# Patient Record
Sex: FEMALE | Race: WHITE | NOT HISPANIC OR LATINO | Employment: FULL TIME | URBAN - NONMETROPOLITAN AREA
[De-identification: names, ages, dates, MRNs, and addresses within clinical notes are randomized per-mention and may not be internally consistent; named-entity substitution may affect disease eponyms.]

---

## 2017-01-16 RX ORDER — CITALOPRAM 40 MG/1
TABLET ORAL
Qty: 30 TABLET | Refills: 3 | Status: SHIPPED | OUTPATIENT
Start: 2017-01-16 | End: 2017-04-21 | Stop reason: SDUPTHER

## 2017-02-16 ENCOUNTER — TELEPHONE (OUTPATIENT)
Dept: OBSTETRICS AND GYNECOLOGY | Facility: CLINIC | Age: 52
End: 2017-02-16

## 2017-02-16 RX ORDER — ESTRADIOL 1 MG/1
1 TABLET ORAL DAILY
Qty: 30 TABLET | Refills: 4 | Status: SHIPPED | OUTPATIENT
Start: 2017-02-16 | End: 2017-07-13 | Stop reason: SDUPTHER

## 2017-02-16 NOTE — TELEPHONE ENCOUNTER
----- Message from Hyacinth Walton sent at 2/16/2017  3:51 PM EST -----  Contact: DR RÍOS'S PT  NEEDS REFILL ON HER ESTRADIOL.  THANKS,  HYACINTH

## 2017-04-24 RX ORDER — CITALOPRAM 40 MG/1
TABLET ORAL
Qty: 30 TABLET | Refills: 1 | Status: SHIPPED | OUTPATIENT
Start: 2017-04-24

## 2017-04-27 RX ORDER — LINACLOTIDE 145 UG/1
CAPSULE, GELATIN COATED ORAL
Qty: 30 CAPSULE | Refills: 3 | Status: SHIPPED | OUTPATIENT
Start: 2017-04-27

## 2017-07-13 RX ORDER — ESTRADIOL 1 MG/1
TABLET ORAL
Qty: 30 TABLET | Refills: 4 | Status: SHIPPED | OUTPATIENT
Start: 2017-07-13 | End: 2020-03-25

## 2020-03-25 RX ORDER — ESTRADIOL 1 MG/1
TABLET ORAL
Qty: 90 TABLET | Refills: 3 | Status: SHIPPED | OUTPATIENT
Start: 2020-03-25 | End: 2021-06-23 | Stop reason: SDUPTHER

## 2021-05-27 RX ORDER — ESTRADIOL 1 MG/1
TABLET ORAL
Qty: 90 TABLET | Refills: 3 | OUTPATIENT
Start: 2021-05-27

## 2021-06-23 ENCOUNTER — OFFICE VISIT (OUTPATIENT)
Dept: OBSTETRICS AND GYNECOLOGY | Facility: CLINIC | Age: 56
End: 2021-06-23

## 2021-06-23 VITALS
BODY MASS INDEX: 29.25 KG/M2 | HEIGHT: 66 IN | DIASTOLIC BLOOD PRESSURE: 72 MMHG | SYSTOLIC BLOOD PRESSURE: 104 MMHG | WEIGHT: 182 LBS

## 2021-06-23 DIAGNOSIS — Z01.419 ENCOUNTER FOR GYNECOLOGICAL EXAMINATION WITHOUT ABNORMAL FINDING: Primary | ICD-10-CM

## 2021-06-23 DIAGNOSIS — Z12.4 SCREENING FOR MALIGNANT NEOPLASM OF CERVIX: ICD-10-CM

## 2021-06-23 DIAGNOSIS — Z12.11 ENCOUNTER FOR SCREENING COLONOSCOPY: ICD-10-CM

## 2021-06-23 DIAGNOSIS — Z12.31 ENCOUNTER FOR SCREENING MAMMOGRAM FOR BREAST CANCER: ICD-10-CM

## 2021-06-23 PROCEDURE — 99386 PREV VISIT NEW AGE 40-64: CPT | Performed by: OBSTETRICS & GYNECOLOGY

## 2021-06-23 RX ORDER — FEXOFENADINE HCL 180 MG/1
TABLET ORAL
COMMUNITY

## 2021-06-23 RX ORDER — ESTRADIOL 1 MG/1
1 TABLET ORAL DAILY
Qty: 90 TABLET | Refills: 4 | Status: SHIPPED | OUTPATIENT
Start: 2021-06-23 | End: 2022-06-27

## 2021-06-23 RX ORDER — UBIDECARENONE 75 MG
CAPSULE ORAL
COMMUNITY

## 2021-06-23 RX ORDER — ESTRADIOL 0.03 MG/D
FILM, EXTENDED RELEASE TRANSDERMAL
COMMUNITY
End: 2021-06-23

## 2021-06-23 NOTE — PROGRESS NOTES
Subjective   Chief Complaint   Patient presents with   • Gynecologic Exam     no concern/complaints     Lovely Cohen is a 56 y.o. year old .  No LMP recorded. Patient has had a hysterectomy.  She presents to be seen because of annual PE  Complete Hyst--Estradiol 1mg  Needs MMG.     OTHER COMPLAINTS:  Nothing else    The following portions of the patient's history were reviewed and updated as appropriate:  She  has a past medical history of Constipation, Herpes simplex, History of anxiety disorder, History of backache (), History of measles, History of mumps, History of postmenopausal hormone replacement therapy, History of varicella, History of vitamin D deficiency, and Palpitations.  She does not have any pertinent problems on file.  She  has a past surgical history that includes  section; Cystoscopy (); Dilation and curettage of uterus (); Foot surgery; Hysterectomy (); Oophorectomy; Shoulder surgery; Tonsillectomy; Colonoscopy; and Knee aspiration.  Her family history includes Bone cancer in her maternal grandmother; Diabetes type II in her maternal grandfather; Heart disease in her maternal grandfather, paternal grandfather, and another family member; Seizures in her daughter and sister; Thyroid cancer in her mother.  She  reports that she has never smoked. She has never used smokeless tobacco. She reports current alcohol use. She reports that she does not use drugs.  Current Outpatient Medications   Medication Sig Dispense Refill   • albuterol (PROVENTIL HFA;VENTOLIN HFA) 108 (90 BASE) MCG/ACT inhaler Inhale 2 puffs every 6 (six) hours as needed for shortness of air. 1 inhaler 1   • aspirin  MG tablet Take 1 tablet by mouth daily. 30 tablet 1   • citalopram (CELEXA) 20 MG tablet Take 1 tablet by mouth daily. 30 tablet 0   • citalopram (CeleXA) 40 MG tablet TAKE 1 TABLET BY MOUTH EVERY DAY 30 tablet 1   • estradiol (ESTRACE) 1 MG tablet TAKE 1 TABLET BY MOUTH DAILY  90 tablet 3   • fexofenadine (ALLEGRA ALLERGY) 180 MG tablet Take  by mouth daily.     • fexofenadine (Allegra Allergy) 180 MG tablet Allegra     • LINZESS 145 MCG capsule 1 TABLET DAILY BEFORE THE 1ST MEAL 30 capsule 3   • MULTIPLE VITAMINS ESSENTIAL PO Take  by mouth daily.     • valACYclovir (VALTREX) 1000 MG tablet Take  by mouth every 12 (twelve) hours.     • vitamin B-12 (CYANOCOBALAMIN) 100 MCG tablet Vitamin B-12       No current facility-administered medications for this visit.     Current Outpatient Medications on File Prior to Visit   Medication Sig   • albuterol (PROVENTIL HFA;VENTOLIN HFA) 108 (90 BASE) MCG/ACT inhaler Inhale 2 puffs every 6 (six) hours as needed for shortness of air.   • aspirin  MG tablet Take 1 tablet by mouth daily.   • citalopram (CELEXA) 20 MG tablet Take 1 tablet by mouth daily.   • citalopram (CeleXA) 40 MG tablet TAKE 1 TABLET BY MOUTH EVERY DAY   • estradiol (ESTRACE) 1 MG tablet TAKE 1 TABLET BY MOUTH DAILY   • fexofenadine (ALLEGRA ALLERGY) 180 MG tablet Take  by mouth daily.   • fexofenadine (Allegra Allergy) 180 MG tablet Allegra   • LINZESS 145 MCG capsule 1 TABLET DAILY BEFORE THE 1ST MEAL   • MULTIPLE VITAMINS ESSENTIAL PO Take  by mouth daily.   • valACYclovir (VALTREX) 1000 MG tablet Take  by mouth every 12 (twelve) hours.   • vitamin B-12 (CYANOCOBALAMIN) 100 MCG tablet Vitamin B-12   • [DISCONTINUED] estradiol (VIVELLE-DOT) 0.025 MG/24HR patch estradiol     No current facility-administered medications on file prior to visit.     She is allergic to augmentin [amoxicillin-pot clavulanate] and penicillins.    Social History    Tobacco Use      Smoking status: Never Smoker      Smokeless tobacco: Never Used    Review of Systems  Consitutional POS: nothing reported    NEG: anorexia or night sweats   Gastointestinal POS: nothing reported    NEG: bloating, change in bowel habits, melena or reflux symptoms   Genitourinary POS: nothing reported    NEG: dysuria or  "hematuria   Integument POS: nothing reported    NEG: moles that are changing in size, shape, color or rashes   Breast POS: nothing reported    NEG: persistent breast lump, skin dimpling or nipple discharge         Respiratory: negative  Cardiovascular: negative          Objective   /72   Ht 167.6 cm (66\")   Wt 82.6 kg (182 lb)   BMI 29.38 kg/m²     General:  well developed; well nourished  no acute distress   Skin:  No suspicious lesions seen   Thyroid: normal to inspection and palpation   Lungs:  breathing is unlabored  clear to auscultation bilaterally   Heart:  Not performed.   Breasts:  Examined in supine position  Symmetric without masses or skin dimpling  Nipples normal without inversion, lesions or discharge  There are no palpable axillary nodes   Abdomen: soft, non-tender; no masses  no umbilical or inguinal hernias are present  no hepato-splenomegaly   Pelvis: Clinical staff was present for exam  External genitalia:  normal appearance of the external genitalia including Bartholin's and [place]'s glands.  :  urethral meatus normal;  Vaginal:  normal pink mucosa without prolapse or lesions.  Cervix:  absent.  Uterus:  absent.  Adnexa:  absent, bilateral.  Rectal:  digital rectal exam not performed; anus visually normal appearing.     Psychiatric: Alert and oriented ×3, mood and affect appropriate  HEENT: Atraumatic, normocephalic, normal scleral icterus  Extremities: 2+ pulses bilaterally, no edema      Lab Review   No data reviewed    Imaging   No data reviewed        Assessment   1. Annual PE WNL     Plan   1. PAP done  2. MMg ordered  3. Diet/exercies    No orders of the defined types were placed in this encounter.         This note was electronically signed.      June 23, 2021      "

## 2021-06-24 ENCOUNTER — TELEPHONE (OUTPATIENT)
Dept: SURGERY | Facility: CLINIC | Age: 56
End: 2021-06-24

## 2021-06-24 LAB
25(OH)D3+25(OH)D2 SERPL-MCNC: 31.7 NG/ML (ref 30–100)
ALBUMIN SERPL-MCNC: 4.6 G/DL (ref 3.5–5.2)
ALBUMIN/GLOB SERPL: 1.8 G/DL
ALP SERPL-CCNC: 97 U/L (ref 39–117)
ALT SERPL-CCNC: 26 U/L (ref 1–33)
AST SERPL-CCNC: 19 U/L (ref 1–32)
BILIRUB SERPL-MCNC: 0.2 MG/DL (ref 0–1.2)
BUN SERPL-MCNC: 18 MG/DL (ref 6–20)
BUN/CREAT SERPL: 27.7 (ref 7–25)
CALCIUM SERPL-MCNC: 9.9 MG/DL (ref 8.6–10.5)
CHLORIDE SERPL-SCNC: 101 MMOL/L (ref 98–107)
CO2 SERPL-SCNC: 27.5 MMOL/L (ref 22–29)
CREAT SERPL-MCNC: 0.65 MG/DL (ref 0.57–1)
ERYTHROCYTE [DISTWIDTH] IN BLOOD BY AUTOMATED COUNT: 13 % (ref 12.3–15.4)
GLOBULIN SER CALC-MCNC: 2.6 GM/DL
GLUCOSE SERPL-MCNC: 101 MG/DL (ref 65–99)
HBA1C MFR BLD: 5.5 % (ref 4.8–5.6)
HCT VFR BLD AUTO: 40.2 % (ref 34–46.6)
HGB BLD-MCNC: 13.1 G/DL (ref 12–15.9)
MCH RBC QN AUTO: 29.6 PG (ref 26.6–33)
MCHC RBC AUTO-ENTMCNC: 32.6 G/DL (ref 31.5–35.7)
MCV RBC AUTO: 90.7 FL (ref 79–97)
PLATELET # BLD AUTO: 321 10*3/MM3 (ref 140–450)
POTASSIUM SERPL-SCNC: 4 MMOL/L (ref 3.5–5.2)
PROT SERPL-MCNC: 7.2 G/DL (ref 6–8.5)
RBC # BLD AUTO: 4.43 10*6/MM3 (ref 3.77–5.28)
SODIUM SERPL-SCNC: 140 MMOL/L (ref 136–145)
TSH SERPL DL<=0.005 MIU/L-ACNC: 0.84 UIU/ML (ref 0.27–4.2)
WBC # BLD AUTO: 9.49 10*3/MM3 (ref 3.4–10.8)

## 2021-06-24 NOTE — PROGRESS NOTES
Please let patient know blood work looks good.  I would encourage her to be taking a women's multiday vitamin.  Thank you

## 2021-06-30 DIAGNOSIS — Z01.419 ENCOUNTER FOR GYNECOLOGICAL EXAMINATION WITHOUT ABNORMAL FINDING: ICD-10-CM

## 2021-07-07 NOTE — TELEPHONE ENCOUNTER
I called pt, left another message on her voice mail asking her to return my call in regards to scheduling a screening colonoscopy.  I will mail a reminder letter to pt as well.

## 2021-07-13 RX ORDER — POLYETHYLENE GLYCOL 3350 17 G/17G
238 POWDER, FOR SOLUTION ORAL ONCE
Qty: 238 G | Refills: 0 | Status: SHIPPED | OUTPATIENT
Start: 2021-07-13 | End: 2021-07-13

## 2021-07-13 RX ORDER — BISACODYL 5 MG
TABLET, DELAYED RELEASE (ENTERIC COATED) ORAL
Qty: 4 TABLET | Refills: 0 | Status: SHIPPED | OUTPATIENT
Start: 2021-07-13

## 2021-07-28 ENCOUNTER — PREP FOR SURGERY (OUTPATIENT)
Dept: OTHER | Facility: HOSPITAL | Age: 56
End: 2021-07-28

## 2021-07-28 DIAGNOSIS — Z12.4 SCREENING FOR MALIGNANT NEOPLASM OF CERVIX: Primary | ICD-10-CM

## 2021-07-28 DIAGNOSIS — Z12.11 SCREENING FOR COLON CANCER: ICD-10-CM

## 2021-07-30 ENCOUNTER — OUTSIDE FACILITY SERVICE (OUTPATIENT)
Dept: SURGERY | Facility: CLINIC | Age: 56
End: 2021-07-30

## 2021-07-30 PROCEDURE — 45384 COLONOSCOPY W/LESION REMOVAL: CPT | Performed by: SURGERY

## 2021-08-06 ENCOUNTER — HOSPITAL ENCOUNTER (OUTPATIENT)
Dept: MAMMOGRAPHY | Facility: HOSPITAL | Age: 56
Discharge: HOME | End: 2021-08-06
Admitting: OBSTETRICS & GYNECOLOGY

## 2021-08-06 DIAGNOSIS — Z01.419 ENCOUNTER FOR GYNECOLOGICAL EXAMINATION WITHOUT ABNORMAL FINDING: ICD-10-CM

## 2021-08-06 PROCEDURE — 77063 BREAST TOMOSYNTHESIS BI: CPT

## 2021-08-06 PROCEDURE — 77067 SCR MAMMO BI INCL CAD: CPT

## 2022-06-05 ENCOUNTER — HOSPITAL ENCOUNTER (EMERGENCY)
Facility: HOSPITAL | Age: 57
Discharge: HOME | End: 2022-06-06
Attending: EMERGENCY MEDICINE | Admitting: EMERGENCY MEDICINE

## 2022-06-05 VITALS
HEIGHT: 65 IN | SYSTOLIC BLOOD PRESSURE: 154 MMHG | RESPIRATION RATE: 16 BRPM | DIASTOLIC BLOOD PRESSURE: 91 MMHG | HEART RATE: 74 BPM | TEMPERATURE: 98.2 F | WEIGHT: 185 LBS | OXYGEN SATURATION: 99 % | BODY MASS INDEX: 30.82 KG/M2

## 2022-06-05 DIAGNOSIS — H11.32 SUBCONJUNCTIVAL HEMORRHAGE OF LEFT EYE: Primary | ICD-10-CM

## 2022-06-05 DIAGNOSIS — S05.02XA ABRASION OF LEFT CORNEA, INITIAL ENCOUNTER: ICD-10-CM

## 2022-06-05 PROCEDURE — 99283 EMERGENCY DEPT VISIT LOW MDM: CPT

## 2022-06-05 RX ORDER — TETRACAINE HYDROCHLORIDE 5 MG/ML
2 SOLUTION OPHTHALMIC ONCE
Status: COMPLETED | OUTPATIENT
Start: 2022-06-05 | End: 2022-06-06

## 2022-06-06 RX ORDER — GENTAMICIN SULFATE 3 MG/ML
1 SOLUTION/ DROPS OPHTHALMIC EVERY 4 HOURS
Qty: 5 ML | Refills: 0 | Status: SHIPPED | OUTPATIENT
Start: 2022-06-06

## 2022-06-06 RX ORDER — BUTALBITAL, ACETAMINOPHEN AND CAFFEINE 50; 325; 40 MG/1; MG/1; MG/1
1 TABLET ORAL ONCE
Status: COMPLETED | OUTPATIENT
Start: 2022-06-06 | End: 2022-06-06

## 2022-06-06 RX ADMIN — TETRACAINE HYDROCHLORIDE 2 DROP: 5 SOLUTION OPHTHALMIC at 00:08

## 2022-06-06 RX ADMIN — BUTALBITAL, ACETAMINOPHEN, AND CAFFEINE 1 TABLET: 50; 325; 40 TABLET ORAL at 01:22

## 2022-06-06 RX ADMIN — FLUORESCEIN SODIUM 1 STRIP: 1 STRIP OPHTHALMIC at 00:08

## 2022-06-06 NOTE — ED PROVIDER NOTES
Subjective   57-year-old female presents to the ED with a chief complaint of eye pain/eye trauma.  Patient states that a few hours prior to arrival she accidentally hit the left eye with the sharp point of an umbrella.  She had sudden onset of pain in the eye.  She describes it as a sensation that there is a thorn in her eye.  She has sensitivity to light and redness to her left eye.  No prior treatments eluding factors.  No prior evaluations.  No other complaints at this time.          Review of Systems   Eyes: Positive for photophobia, pain and redness. Negative for visual disturbance.   All other systems reviewed and are negative.      Past Medical History:   Diagnosis Date   • Constipation    • Herpes simplex    • History of anxiety disorder    • History of backache    • History of measles     rubeola   • History of mumps    • History of postmenopausal hormone replacement therapy    • History of varicella    • History of vitamin D deficiency    • Palpitations        Allergies   Allergen Reactions   • Augmentin [Amoxicillin-Pot Clavulanate]    • Penicillins        Past Surgical History:   Procedure Laterality Date   •  SECTION     • COLONOSCOPY     • CYSTOSCOPY     • DILATATION AND CURETTAGE     • FOOT SURGERY     • HYSTERECTOMY     • KNEE ASPIRATION     • OOPHORECTOMY     • SHOULDER SURGERY     • TONSILLECTOMY         Family History   Problem Relation Age of Onset   • Thyroid cancer Mother    • Seizures Sister    • Seizures Daughter    • Bone cancer Maternal Grandmother    • Diabetes type II Maternal Grandfather    • Heart disease Maternal Grandfather    • Heart disease Paternal Grandfather    • Heart disease Other         cardiomegaly       Social History     Socioeconomic History   • Marital status:    Tobacco Use   • Smoking status: Never Smoker   • Smokeless tobacco: Never Used   Substance and Sexual Activity   • Alcohol use: Yes     Comment: rare  - wine, socially (1-2 weekl    • Drug use: No   • Sexual activity: Yes     Partners: Male           Objective   Physical Exam  Vitals and nursing note reviewed.   Constitutional:       General: She is not in acute distress.     Appearance: She is well-developed. She is not diaphoretic.   HENT:      Head: Normocephalic and atraumatic.      Nose: Nose normal.   Eyes:      General: Lids are normal. Lids are everted, no foreign bodies appreciated.         Right eye: No foreign body, discharge or hordeolum.         Left eye: No foreign body, discharge or hordeolum.      Extraocular Movements:      Left eye: Normal extraocular motion and no nystagmus.      Conjunctiva/sclera:      Left eye: Left conjunctiva is injected. No chemosis, exudate or hemorrhage.     Comments: Left conjunctival injection  Subconjunctival hemorrhage medial aspect of left eye, 11 o'clock position  Small corneal abrasion lateral to the iris 9 o'clock position  Negative Kiesha sign   Cardiovascular:      Rate and Rhythm: Normal rate and regular rhythm.   Pulmonary:      Effort: Pulmonary effort is normal. No respiratory distress.      Breath sounds: Normal breath sounds.   Abdominal:      General: There is no distension.      Palpations: Abdomen is soft.      Tenderness: There is no abdominal tenderness. There is no guarding.   Musculoskeletal:         General: No deformity.   Neurological:      Mental Status: She is alert and oriented to person, place, and time.      Cranial Nerves: No cranial nerve deficit.         Procedures           ED Course                                                 MDM  Physical exam reveals loss of dependable hemorrhage and small amount of fluorescein uptake consistent with a corneal abrasion.  Will cover with appropriate management.  Follow-up outpatient as needed.  Patient agreeable to this plan.      Final diagnoses:   Subconjunctival hemorrhage of left eye   Abrasion of left cornea, initial encounter       ED Disposition  ED Disposition     ED  Disposition   Discharge    Condition   Stable    Comment   --             [facility] [place] [address]  [phone]  Schedule an appointment as soon as possible for a visit            Medication List      New Prescriptions    gentamicin 0.3 % ophthalmic solution  Commonly known as: GARAMYCIN  Administer 1 drop to the right eye Every 4 (Four) Hours.           Where to Get Your Medications      These medications were sent to [facility]/pharmacy #[id number] - [place], [place] - [address] - [phone]  - [phone] [address] [address]    Phone: [phone]   · gentamicin 0.3 % ophthalmic solution          Jovon Parikh, DO  06/06/22 0111

## 2022-06-23 ENCOUNTER — HOSPITAL ENCOUNTER (EMERGENCY)
Facility: HOSPITAL | Age: 57
Discharge: HOME | End: 2022-06-23
Attending: EMERGENCY MEDICINE | Admitting: EMERGENCY MEDICINE

## 2022-06-23 ENCOUNTER — APPOINTMENT (OUTPATIENT)
Dept: GENERAL RADIOLOGY | Facility: HOSPITAL | Age: 57
End: 2022-06-23

## 2022-06-23 VITALS
HEIGHT: 66 IN | BODY MASS INDEX: 29.73 KG/M2 | TEMPERATURE: 97.9 F | SYSTOLIC BLOOD PRESSURE: 140 MMHG | RESPIRATION RATE: 18 BRPM | DIASTOLIC BLOOD PRESSURE: 63 MMHG | OXYGEN SATURATION: 96 % | HEART RATE: 94 BPM | WEIGHT: 185 LBS

## 2022-06-23 DIAGNOSIS — M72.2 PLANTAR FASCIITIS OF LEFT FOOT: Primary | ICD-10-CM

## 2022-06-23 PROCEDURE — 25010000002 KETOROLAC TROMETHAMINE PER 15 MG: Performed by: EMERGENCY MEDICINE

## 2022-06-23 PROCEDURE — 73630 X-RAY EXAM OF FOOT: CPT

## 2022-06-23 PROCEDURE — 96372 THER/PROPH/DIAG INJ SC/IM: CPT

## 2022-06-23 PROCEDURE — 99282 EMERGENCY DEPT VISIT SF MDM: CPT

## 2022-06-23 RX ORDER — KETOROLAC TROMETHAMINE 30 MG/ML
30 INJECTION, SOLUTION INTRAMUSCULAR; INTRAVENOUS ONCE
Status: COMPLETED | OUTPATIENT
Start: 2022-06-23 | End: 2022-06-23

## 2022-06-23 RX ORDER — KETOROLAC TROMETHAMINE 30 MG/ML
30 INJECTION, SOLUTION INTRAMUSCULAR; INTRAVENOUS EVERY 6 HOURS PRN
Status: DISCONTINUED | OUTPATIENT
Start: 2022-06-23 | End: 2022-06-23

## 2022-06-23 RX ADMIN — KETOROLAC TROMETHAMINE 30 MG: 30 INJECTION, SOLUTION INTRAMUSCULAR; INTRAVENOUS at 22:30

## 2022-06-24 NOTE — ED PROVIDER NOTES
Subjective   Chief Complaint: Left foot pain    History of Present Illness: This is a 57-year-old female patient comes into the ED today complaining of left foot pain.  Patient states she was rolling her garbage cans to the curb when she felt a pop on the base of her foot.  Patient states she is unable to bear weight on her foot at this time.  Patient rates her pain is 8 out of 10.    Nurses Notes reviewed and agree, including vitals, allergies, social history and prior medical history.                Review of Systems   Musculoskeletal: Positive for myalgias.       Past Medical History:   Diagnosis Date   • Constipation    • Herpes simplex    • History of anxiety disorder    • History of backache    • History of measles     rubeola   • History of mumps    • History of postmenopausal hormone replacement therapy    • History of varicella    • History of vitamin D deficiency    • Palpitations        Allergies   Allergen Reactions   • Augmentin [Amoxicillin-Pot Clavulanate]    • Penicillins        Past Surgical History:   Procedure Laterality Date   •  SECTION     • COLONOSCOPY     • CYSTOSCOPY     • DILATATION AND CURETTAGE     • FOOT SURGERY     • HYSTERECTOMY     • KNEE ASPIRATION     • OOPHORECTOMY     • SHOULDER SURGERY     • TONSILLECTOMY         Family History   Problem Relation Age of Onset   • Thyroid cancer Mother    • Seizures Sister    • Seizures Daughter    • Bone cancer Maternal Grandmother    • Diabetes type II Maternal Grandfather    • Heart disease Maternal Grandfather    • Heart disease Paternal Grandfather    • Heart disease Other         cardiomegaly       Social History     Socioeconomic History   • Marital status:    Tobacco Use   • Smoking status: Never Smoker   • Smokeless tobacco: Never Used   Substance and Sexual Activity   • Alcohol use: Yes     Comment: rare  - wine, socially (1-2 weekl   • Drug use: No   • Sexual activity: Yes     Partners: Male            Objective   Physical Exam  Vitals and nursing note reviewed.   Constitutional:       Appearance: Normal appearance.   HENT:      Head: Normocephalic and atraumatic.   Eyes:      Extraocular Movements: Extraocular movements intact.      Pupils: Pupils are equal, round, and reactive to light.   Cardiovascular:      Rate and Rhythm: Normal rate and regular rhythm.      Pulses: Normal pulses.      Heart sounds: Normal heart sounds.   Pulmonary:      Effort: Pulmonary effort is normal.      Breath sounds: Normal breath sounds.   Abdominal:      General: Abdomen is flat. Bowel sounds are normal.      Palpations: Abdomen is soft.   Musculoskeletal:      Cervical back: Normal range of motion and neck supple.      Comments: Mild tenderness to palpation plantar foot   Skin:     Capillary Refill: Capillary refill takes less than 2 seconds.   Neurological:      General: No focal deficit present.      Mental Status: She is alert and oriented to person, place, and time. Mental status is at baseline.      GCS: GCS eye subscore is 4. GCS verbal subscore is 5. GCS motor subscore is 6.      Sensory: Sensation is intact.      Motor: Motor function is intact.      Gait: Gait is intact.   Psychiatric:         Attention and Perception: Attention and perception normal.         Mood and Affect: Mood and affect normal.         Speech: Speech normal.         Behavior: Behavior normal. Behavior is cooperative.         Procedures           ED Course                                           MDM    Final diagnoses:   Plantar fasciitis of left foot       ED Disposition  ED Disposition     ED Disposition   Discharge    Condition   Stable    Comment   --             Provider, No Known  [place] [id number]    In 1 week  Follow-up         Medication List      No changes were made to your prescriptions during this visit.          Vinod Marin, RAMÓN  06/23/22 [id number]

## 2022-06-27 RX ORDER — ESTRADIOL 1 MG/1
TABLET ORAL
Qty: 90 TABLET | Refills: 0 | Status: SHIPPED | OUTPATIENT
Start: 2022-06-27 | End: 2023-03-27 | Stop reason: SDUPTHER

## 2023-03-27 RX ORDER — ESTRADIOL 1 MG/1
1 TABLET ORAL DAILY
Qty: 90 TABLET | Refills: 0 | Status: SHIPPED | OUTPATIENT
Start: 2023-03-27

## 2023-09-25 RX ORDER — ESTRADIOL 1 MG/1
TABLET ORAL
Qty: 90 TABLET | Refills: 0 | OUTPATIENT
Start: 2023-09-25

## 2023-10-13 RX ORDER — ESTRADIOL 1 MG/1
TABLET ORAL
Qty: 90 TABLET | Refills: 3 | Status: SHIPPED | OUTPATIENT
Start: 2023-10-13

## 2023-11-21 ENCOUNTER — OFFICE VISIT (OUTPATIENT)
Dept: OBSTETRICS AND GYNECOLOGY | Facility: CLINIC | Age: 58
End: 2023-11-21
Payer: COMMERCIAL

## 2023-11-21 VITALS
WEIGHT: 188.8 LBS | SYSTOLIC BLOOD PRESSURE: 140 MMHG | HEIGHT: 66 IN | BODY MASS INDEX: 30.34 KG/M2 | DIASTOLIC BLOOD PRESSURE: 80 MMHG

## 2023-11-21 DIAGNOSIS — Z01.419 ENCOUNTER FOR GYNECOLOGICAL EXAMINATION WITHOUT ABNORMAL FINDING: ICD-10-CM

## 2023-11-21 DIAGNOSIS — Z12.31 ENCOUNTER FOR SCREENING MAMMOGRAM FOR MALIGNANT NEOPLASM OF BREAST: Primary | ICD-10-CM

## 2023-11-21 DIAGNOSIS — Z00.00 ANNUAL PHYSICAL EXAM: ICD-10-CM

## 2023-11-21 RX ORDER — ESTRADIOL 1 MG/1
1 TABLET ORAL DAILY
Qty: 90 TABLET | Refills: 3 | Status: SHIPPED | OUTPATIENT
Start: 2023-11-21

## 2023-11-21 NOTE — PROGRESS NOTES
Subjective   Chief Complaint   Patient presents with    Gynecologic Exam     Yearly exam and pap smear     Lovely Cohen is a 58 y.o. year old .  No LMP recorded. Patient has had a hysterectomy.  She presents to be seen because of annual exam.     OTHER COMPLAINTS:  Complete hyst many years ago    The following portions of the patient's history were reviewed and updated as appropriate:She  has a past medical history of Constipation, Herpes simplex, History of anxiety disorder, History of backache (), History of measles, History of mumps, History of postmenopausal hormone replacement therapy, History of varicella, History of vitamin D deficiency, and Palpitations.  She does not have any pertinent problems on file.  She  has a past surgical history that includes  section; Cystoscopy (); Dilation and curettage of uterus (); Foot surgery; Hysterectomy (); Oophorectomy; Shoulder surgery; Tonsillectomy; Colonoscopy; and Knee aspiration.  Her family history includes Bone cancer in her maternal grandmother; Diabetes type II in her maternal grandfather; Heart disease in her maternal grandfather, paternal grandfather, and another family member; Seizures in her daughter and sister; Thyroid cancer in her mother.  She  reports that she has never smoked. She has never used smokeless tobacco. She reports current alcohol use. She reports that she does not use drugs.  Current Outpatient Medications   Medication Sig Dispense Refill    estradiol (ESTRACE) 1 MG tablet TAKE 1 TABLET BY MOUTH EVERY DAY 90 tablet 3    gentamicin (GARAMYCIN) 0.3 % ophthalmic solution Administer 1 drop to the right eye Every 4 (Four) Hours. 5 mL 0    LINZESS 145 MCG capsule 1 TABLET DAILY BEFORE THE 1ST MEAL 30 capsule 3    MULTIPLE VITAMINS ESSENTIAL PO Take  by mouth daily.      valACYclovir (VALTREX) 1000 MG tablet Take  by mouth every 12 (twelve) hours.      vitamin B-12 (CYANOCOBALAMIN) 100 MCG tablet Vitamin B-12        No current facility-administered medications for this visit.     Current Outpatient Medications on File Prior to Visit   Medication Sig    estradiol (ESTRACE) 1 MG tablet TAKE 1 TABLET BY MOUTH EVERY DAY    gentamicin (GARAMYCIN) 0.3 % ophthalmic solution Administer 1 drop to the right eye Every 4 (Four) Hours.    LINZESS 145 MCG capsule 1 TABLET DAILY BEFORE THE 1ST MEAL    MULTIPLE VITAMINS ESSENTIAL PO Take  by mouth daily.    valACYclovir (VALTREX) 1000 MG tablet Take  by mouth every 12 (twelve) hours.    vitamin B-12 (CYANOCOBALAMIN) 100 MCG tablet Vitamin B-12    [DISCONTINUED] albuterol (PROVENTIL HFA;VENTOLIN HFA) 108 (90 BASE) MCG/ACT inhaler Inhale 2 puffs every 6 (six) hours as needed for shortness of air. (Patient not taking: Reported on 11/21/2023)    [DISCONTINUED] aspirin  MG tablet Take 1 tablet by mouth daily. (Patient not taking: Reported on 11/21/2023)    [DISCONTINUED] bisacodyl (Dulcolax) 5 MG EC tablet Take 2 @ 3pm, 2 @ 7 pm day prior to colonoscopy (Patient not taking: Reported on 11/21/2023)    [DISCONTINUED] citalopram (CELEXA) 20 MG tablet Take 1 tablet by mouth daily. (Patient not taking: Reported on 11/21/2023)    [DISCONTINUED] citalopram (CeleXA) 40 MG tablet TAKE 1 TABLET BY MOUTH EVERY DAY (Patient not taking: Reported on 11/21/2023)    [DISCONTINUED] fexofenadine (ALLEGRA) 180 MG tablet Take  by mouth daily. (Patient not taking: Reported on 11/21/2023)    [DISCONTINUED] fexofenadine (ALLEGRA) 180 MG tablet Allegra (Patient not taking: Reported on 11/21/2023)     No current facility-administered medications on file prior to visit.     She is allergic to augmentin [amoxicillin-pot clavulanate] and penicillins.    Social History    Tobacco Use      Smoking status: Never      Smokeless tobacco: Never    Review of Systems  Consitutional POS: nothing reported    NEG: anorexia or night sweats   Gastointestinal POS: nothing reported    NEG: bloating, change in bowel habits,  "melena, or reflux symptoms   Genitourinary POS: nothing reported    NEG: dysuria or hematuria   Integument POS: nothing reported    NEG: moles that are changing in size, shape, color or rashes   Breast POS: nothing reported    NEG: persistent breast lump, skin dimpling, or nipple discharge         Respiratory: negative  Cardiovascular: negative  GYN:  negative          Objective   /80   Ht 166.4 cm (65.5\")   Wt 85.6 kg (188 lb 12.8 oz)   BMI 30.94 kg/m²     General:  well developed; well nourished  no acute distress   Skin:  No suspicious lesions seen   Thyroid: normal to inspection and palpation   Lungs:  breathing is unlabored  clear to auscultation bilaterally   Heart:  regular rate and rhythm, S1, S2 normal, no murmur, click, rub or gallop   Breasts:  Examined in supine position  Symmetric without masses or skin dimpling  Nipples normal without inversion, lesions or discharge  There are no palpable axillary nodes   Abdomen: soft, non-tender; no masses  no umbilical or inguinal hernias are present  no hepato-splenomegaly   Pelvis: Clinical staff was present for exam  External genitalia:  normal appearance of the external genitalia including Bartholin's and [place]'s glands.  :  urethral meatus normal;  Vaginal:  normal pink mucosa without prolapse or lesions.  Cervix:  absent.  Uterus:  absent.  Adnexa:  absent, bilateral.  Rectal:  digital rectal exam not performed; anus visually normal appearing.     Psychiatric: Alert and oriented ×3, mood and affect appropriate  HEENT: Atraumatic, normocephalic, normal scleral icterus  Extremities: 2+ pulses bilaterally, no edema      Lab Review   No data reviewed    Imaging   Mammo Screening Digital Tomosynthesis Bilateral With CAD (08/06/2021 15:48)        Assessment   Noremal PE     Plan   PAP done  MMG ordered  Diet/exercise      No orders of the defined types were placed in this encounter.         This note was electronically signed.      November 21, 2023      "

## 2023-11-27 LAB — REF LAB TEST METHOD: NORMAL

## 2024-01-10 ENCOUNTER — HOSPITAL ENCOUNTER (OUTPATIENT)
Dept: MAMMOGRAPHY | Facility: HOSPITAL | Age: 59
Discharge: HOME | End: 2024-01-10
Admitting: OBSTETRICS & GYNECOLOGY
Payer: COMMERCIAL

## 2024-01-10 DIAGNOSIS — Z12.31 ENCOUNTER FOR SCREENING MAMMOGRAM FOR MALIGNANT NEOPLASM OF BREAST: ICD-10-CM

## 2024-01-10 PROCEDURE — 77067 SCR MAMMO BI INCL CAD: CPT

## 2024-01-10 PROCEDURE — 77063 BREAST TOMOSYNTHESIS BI: CPT

## 2024-07-19 ENCOUNTER — TRANSCRIBE ORDERS (OUTPATIENT)
Dept: ULTRASOUND IMAGING | Facility: HOSPITAL | Age: 59
End: 2024-07-19
Payer: COMMERCIAL

## 2024-07-19 ENCOUNTER — HOSPITAL ENCOUNTER (OUTPATIENT)
Dept: ULTRASOUND IMAGING | Facility: HOSPITAL | Age: 59
Discharge: HOME | End: 2024-07-19
Admitting: ORTHOPAEDIC SURGERY
Payer: COMMERCIAL

## 2024-07-19 DIAGNOSIS — R22.41 KNEE MASS, RIGHT: ICD-10-CM

## 2024-07-19 DIAGNOSIS — R22.41 KNEE MASS, RIGHT: Primary | ICD-10-CM

## 2024-07-19 PROCEDURE — 93971 EXTREMITY STUDY: CPT

## 2024-12-05 ENCOUNTER — TRANSCRIBE ORDERS (OUTPATIENT)
Dept: OBSTETRICS AND GYNECOLOGY | Facility: CLINIC | Age: 59
End: 2024-12-05
Payer: COMMERCIAL

## 2024-12-05 DIAGNOSIS — Z12.31 VISIT FOR SCREENING MAMMOGRAM: Primary | ICD-10-CM

## 2024-12-30 RX ORDER — ESTRADIOL 1 MG/1
1 TABLET ORAL DAILY
Qty: 90 TABLET | Refills: 0 | Status: SHIPPED | OUTPATIENT
Start: 2024-12-30

## 2025-03-26 RX ORDER — ESTRADIOL 1 MG/1
1 TABLET ORAL DAILY
Qty: 90 TABLET | Refills: 4 | Status: SHIPPED | OUTPATIENT
Start: 2025-03-26

## 2025-07-01 LAB
NCCN CRITERIA FLAG: NORMAL
TYRER CUZICK SCORE: 8.1

## 2025-07-15 ENCOUNTER — HOSPITAL ENCOUNTER (OUTPATIENT)
Facility: HOSPITAL | Age: 60
Discharge: HOME | End: 2025-07-15
Admitting: OBSTETRICS & GYNECOLOGY
Payer: COMMERCIAL

## 2025-07-15 DIAGNOSIS — Z12.31 VISIT FOR SCREENING MAMMOGRAM: ICD-10-CM

## 2025-07-15 PROCEDURE — 77063 BREAST TOMOSYNTHESIS BI: CPT

## 2025-07-15 PROCEDURE — 77067 SCR MAMMO BI INCL CAD: CPT
